# Patient Record
Sex: MALE | Race: AMERICAN INDIAN OR ALASKA NATIVE
[De-identification: names, ages, dates, MRNs, and addresses within clinical notes are randomized per-mention and may not be internally consistent; named-entity substitution may affect disease eponyms.]

---

## 2020-02-21 ENCOUNTER — HOSPITAL ENCOUNTER (EMERGENCY)
Dept: HOSPITAL 5 - ED | Age: 30
Discharge: HOME | End: 2020-02-21
Payer: COMMERCIAL

## 2020-02-21 VITALS — SYSTOLIC BLOOD PRESSURE: 118 MMHG | DIASTOLIC BLOOD PRESSURE: 72 MMHG

## 2020-02-21 DIAGNOSIS — J45.909: ICD-10-CM

## 2020-02-21 DIAGNOSIS — R07.89: Primary | ICD-10-CM

## 2020-02-21 LAB
BASOPHILS # (AUTO): 0.1 K/MM3 (ref 0–0.1)
BASOPHILS NFR BLD AUTO: 0.6 % (ref 0–1.8)
BUN SERPL-MCNC: 13 MG/DL (ref 9–20)
BUN/CREAT SERPL: 16 %
CALCIUM SERPL-MCNC: 9.2 MG/DL (ref 8.4–10.2)
EOSINOPHIL # BLD AUTO: 0.2 K/MM3 (ref 0–0.4)
EOSINOPHIL NFR BLD AUTO: 1.8 % (ref 0–4.3)
HCT VFR BLD CALC: 41.8 % (ref 35.5–45.6)
HEMOLYSIS INDEX: 23
HGB BLD-MCNC: 14.4 GM/DL (ref 11.8–15.2)
LYMPHOCYTES # BLD AUTO: 2.1 K/MM3 (ref 1.2–5.4)
LYMPHOCYTES NFR BLD AUTO: 23.5 % (ref 13.4–35)
MCHC RBC AUTO-ENTMCNC: 34 % (ref 32–34)
MCV RBC AUTO: 93 FL (ref 84–94)
MONOCYTES # (AUTO): 0.4 K/MM3 (ref 0–0.8)
MONOCYTES % (AUTO): 4.2 % (ref 0–7.3)
PLATELET # BLD: 317 K/MM3 (ref 140–440)
RBC # BLD AUTO: 4.49 M/MM3 (ref 3.65–5.03)

## 2020-02-21 PROCEDURE — 36415 COLL VENOUS BLD VENIPUNCTURE: CPT

## 2020-02-21 PROCEDURE — 93005 ELECTROCARDIOGRAM TRACING: CPT

## 2020-02-21 PROCEDURE — 71045 X-RAY EXAM CHEST 1 VIEW: CPT

## 2020-02-21 PROCEDURE — 80048 BASIC METABOLIC PNL TOTAL CA: CPT

## 2020-02-21 PROCEDURE — 83735 ASSAY OF MAGNESIUM: CPT

## 2020-02-21 PROCEDURE — 82550 ASSAY OF CK (CPK): CPT

## 2020-02-21 PROCEDURE — 85025 COMPLETE CBC W/AUTO DIFF WBC: CPT

## 2020-02-21 PROCEDURE — 93010 ELECTROCARDIOGRAM REPORT: CPT

## 2020-02-21 PROCEDURE — 84484 ASSAY OF TROPONIN QUANT: CPT

## 2020-02-21 PROCEDURE — 82553 CREATINE MB FRACTION: CPT

## 2020-02-21 NOTE — XRAY REPORT
CHEST 1 VIEW 



INDICATION:  Chest pain for one day.



COMPARISON:  None



FINDINGS:

Support devices: None. 



Heart: Within normal limits. 

Lungs/Pleura: No acute air space or interstitial disease. 



Additional findings: None.



IMPRESSION:

 No acute findings.



Signer Name: Papi Izquierdo Jr, MD 

Signed: 2/21/2020 11:58 AM

 Workstation Name: [id number]

## 2020-02-21 NOTE — EMERGENCY DEPARTMENT REPORT
ED Chest Pain HPI





- General


Chief Complaint: Chest Pain


Stated Complaint: CHEST PAIN


Time Seen by Provider: 02/21/20 11:39


Source: patient


Mode of arrival: Ambulatory


Limitations: No Limitations





- History of Present Illness


Initial Comments: 


This is a 29-year-old gentleman that was sent here from [facility].  It appears 

that primarily they were concerned about his EKG which shows early 

repolarization.  The patient describes a very atypical chest pain pattern with 

"electric shocks" in his anterior chest which last for moments.  This occurred 

this morning.  It was nonpleuritic and nonexertional in nature.  He is 

asymptomatic at this time.  He denies any other associated symptoms.  He states 

he has had chest pain before but this is somewhat different.  He denies leg pain

or swelling.  He is completely without dyspnea.





MD Complaint: chest pain


-: Gradual, minutes(s)


Onset: during rest


Pain Location: substernal


Pain Radiation: none


Severity: mild


Quality: other


Consistency: intermittent, now resolved


Improves With: nothing


Worsens With: nothing


re: denies: nausea, vomting, diaphoresis, dyspnea, sense of impending doom


Other Symptoms: denies: cough, fever, syncope


Treatments Prior to Arrival: none





- Related Data


                                    Allergies











Allergy/AdvReac Type Severity Reaction Status Date / Time


 


No Known Allergies Allergy   Verified 02/21/20 10:53














Heart Score





- HEART Score


History: Slightly suspicious


EKG: Normal


Age: < 45


Risk factors: 1-2 risk factors


Troponin: < normal limit


HEART Score: 1





- Critical Actions


Critical Actions: 0-3 pts:0.9-1.7%risk of adverse cardiac event.Candidate for 

discharge





ED Review of Systems


ROS: 


Stated complaint: CHEST PAIN


Other details as noted in HPI





Constitutional: denies: chills, fever


Eyes: denies: eye pain, eye discharge, vision change


ENT: denies: ear pain, throat pain


Respiratory: denies: cough, shortness of breath, wheezing


Cardiovascular: chest pain.  denies: palpitations


Endocrine: no symptoms reported


Gastrointestinal: denies: abdominal pain, nausea, diarrhea


Genitourinary: denies: urgency, dysuria


Musculoskeletal: denies: back pain, joint swelling, arthralgia


Skin: denies: rash, lesions


Neurological: denies: headache, weakness, paresthesias


Psychiatric: denies: anxiety, depression


Hematological/Lymphatic: denies: easy bleeding, easy bruising





ED Past Medical Hx





- Past Medical History


Hx Asthma: Yes (BRONCHITIS)





- Social History


Smoking Status: Never Smoker


Substance Use Type: None





ED Physical Exam





- General


Limitations: No Limitations


General appearance: alert, in no apparent distress





- Head


Head exam: Present: atraumatic, normocephalic





- Eye


Eye exam: Present: normal appearance.  Absent: scleral icterus





- ENT


ENT exam: Present: mucous membranes moist





- Neck


Neck exam: Present: normal inspection





- Respiratory


Respiratory exam: Present: normal lung sounds bilaterally.  Absent: respiratory 

distress





- Cardiovascular


Cardiovascular Exam: Present: regular rate, normal rhythm.  Absent: systolic 

murmur, diastolic murmur, rubs, gallop





- GI/Abdominal


GI/Abdominal exam: Present: soft, normal bowel sounds.  Absent: distended, tende

rness, guarding, rebound





- Rectal


Rectal exam: Present: deferred





- Extremities Exam


Extremities exam: Present: normal inspection, full ROM, normal capillary refill.

 Absent: tenderness, pedal edema, joint swelling, calf tenderness





- Back Exam


Back exam: Present: normal inspection





- Neurological Exam


Neurological exam: Present: alert, oriented X3, CN II-XII intact.  Absent: motor

sensory deficit





- Psychiatric


Psychiatric exam: Present: normal affect, normal mood





- Skin


Skin exam: Present: warm, dry, intact, normal color.  Absent: rash





ED Course


                                   Vital Signs











  02/21/20 02/21/20 02/21/20





  10:51 11:42 11:45


 


Temperature 98.1 F  


 


Pulse Rate 78 73 72


 


Respiratory 18 17 22





Rate   


 


Blood Pressure 136/70  131/65


 


O2 Sat by Pulse 100 96 100





Oximetry   














  02/21/20 02/21/20 02/21/20





  12:00 12:15 12:30


 


Temperature   


 


Pulse Rate 75 74 71


 


Respiratory 13 14 13





Rate   


 


Blood Pressure 128/76 138/81 123/57


 


O2 Sat by Pulse 97 100 94





Oximetry   














  02/21/20 02/21/20 02/21/20





  12:45 13:00 13:15


 


Temperature   


 


Pulse Rate 76 73 73


 


Respiratory 16 12 11 L





Rate   


 


Blood Pressure 126/67 114/65 124/52


 


O2 Sat by Pulse 98 98 100





Oximetry   














  02/21/20 02/21/20 02/21/20





  13:30 13:46 14:00


 


Temperature   


 


Pulse Rate 73 82 78


 


Respiratory 18 19 17





Rate   


 


Blood Pressure 131/110 106/65 118/81


 


O2 Sat by Pulse 99 98 99





Oximetry   














- Reevaluation(s)


Reevaluation #1: 


Patient remains asymptomatic here.


02/21/20 14:09








TABATHA score





- Tabatha Score


Age > 65: (0) No


Aspirin use within the Past 7 Days: (0) No


3 or more CAD Risk Factors: (0) No


2 or more Angina events in past 24 hrs: (0) No


Known CAD with more than 50% Stenosis: (0) No


Elevated Cardiac Markers: (0) No


ST Deviation Greater than 0.5mm: (0) No


TABATHA Score: 0





ED Medical Decision Making





- Lab Data


Result diagrams: 


                                 02/21/20 12:13





                                 02/21/20 12:13








                         Laboratory Results - last 24 hr











  02/21/20 02/21/20





  12:13 12:13


 


WBC  9.0 


 


RBC  4.49 


 


Hgb  14.4 


 


Hct  41.8 


 


MCV  93 


 


MCH  32 


 


MCHC  34 


 


RDW  13.6 


 


Plt Count  317 


 


Lymph % (Auto)  23.5 


 


Mono % (Auto)  4.2 


 


Eos % (Auto)  1.8 


 


Baso % (Auto)  0.6 


 


Lymph #  2.1 


 


Mono #  0.4 


 


Eos #  0.2 


 


Baso #  0.1 


 


Seg Neutrophils %  69.9 


 


Seg Neutrophils #  6.3 


 


Sodium   144


 


Potassium   3.8


 


Chloride   107.0


 


Carbon Dioxide   21 L


 


Anion Gap   20


 


BUN   13


 


Creatinine   0.8


 


Estimated GFR   > 60


 


BUN/Creatinine Ratio   16


 


Glucose   137 H


 


Calcium   9.2


 


Magnesium   1.90


 


CK-MB (CK-2)   2.8


 


Troponin T   < 0.010














- EKG Data


-: EKG Interpreted by Me


EKG shows normal: sinus rhythm, axis, intervals, QRS complexes, ST-T waves


Rate: normal





- EKG Data


Interpretation: other (A normal early repolarization pattern)





- Radiology Data


Radiology results: report reviewed (Normal chest x-ray)


Critical care attestation.: 


If time is entered above; I have spent that time in minutes in the direct care 

of this critically ill patient, excluding procedure time.








ED Disposition


Clinical Impression: 


 Atypical chest pain





Disposition: DC-01 TO HOME OR SELFCARE


Is pt being admited?: No


Does the pt Need Aspirin: No


Condition: Stable


Instructions:  Chest Pain (ED)


Additional Instructions: 


Return any prolonged chest pain for further evaluation.  Otherwise I would 

recommend follow-up with your primary care physician as well as a cardiologist. 

See referral.


Referrals: 


JACE JAVIER MD [Primary Care Provider] - 2-3 Days


RADHA GARCIA MD [Staff Physician] - 3-5 Days


Time of Disposition: 14:10

## 2020-03-03 ENCOUNTER — HOSPITAL ENCOUNTER (EMERGENCY)
Dept: HOSPITAL 5 - ED | Age: 30
Discharge: HOME | End: 2020-03-03
Payer: COMMERCIAL

## 2020-03-03 VITALS — DIASTOLIC BLOOD PRESSURE: 75 MMHG | SYSTOLIC BLOOD PRESSURE: 132 MMHG

## 2020-03-03 DIAGNOSIS — Z79.1: ICD-10-CM

## 2020-03-03 DIAGNOSIS — Z79.899: ICD-10-CM

## 2020-03-03 DIAGNOSIS — J45.909: ICD-10-CM

## 2020-03-03 DIAGNOSIS — J03.90: Primary | ICD-10-CM

## 2020-03-03 PROCEDURE — 99282 EMERGENCY DEPT VISIT SF MDM: CPT

## 2020-03-03 NOTE — EMERGENCY DEPARTMENT REPORT
ED ENT HPI





- General


Stated complaint: SWELIING TONSILS/PAIN


Time Seen by Provider: 03/03/20 17:02





- History of Present Illness


Initial comments: 





This is a 30-year-old male nontoxic well in appearance with no signs of distress

presents to the ED with complaint of sore throat.  Patient denies any drooling 

or hoarseness.  Patient denies any other symptoms.  Denies any fever, chills, 

headache, nausea, vomiting, chest pain or SOB.  Denies any other complaints. 





-: days(s)


Location: throat


Severity: mild


Severity scale (0 -10): 8


Quality: aching


Consistency: constant


Improves with: none


Worsens with: swallowing


Associated Symptoms: pain with swallowing, sore throat.  denies: fever, cough, 

gum swelling, toothache, tinnitus, hearing loss, discharge from ear, rhinorrhea





- Related Data


                                  Previous Rx's











 Medication  Instructions  Recorded  Last Taken  Type


 


Amoxicillin/K Clav Tab [Augmentin 1 tab PO Q12HR #20 tab 03/03/20 Unknown Rx





875 mg]    


 


Ibuprofen [Motrin] 600 mg PO Q8H PRN #20 tablet 03/03/20 Unknown Rx


 


Nystas/Diphen/Xyl Visc/Mylanta 15 ml MM Q6H PRN 5 Days  ml 03/03/20 Unknown Rx





[Magic Mouthwash]    











                                    Allergies











Allergy/AdvReac Type Severity Reaction Status Date / Time


 


No Known Allergies Allergy   Verified 02/21/20 10:53














ED Dental HPI





- General


Stated complaint: SWELIING TONSILS/PAIN


Time Seen by Provider: 03/03/20 17:02





- Related Data


                                  Previous Rx's











 Medication  Instructions  Recorded  Last Taken  Type


 


Amoxicillin/K Clav Tab [Augmentin 1 tab PO Q12HR #20 tab 03/03/20 Unknown Rx





875 mg]    


 


Ibuprofen [Motrin] 600 mg PO Q8H PRN #20 tablet 03/03/20 Unknown Rx


 


Nystas/Diphen/Xyl Visc/Mylanta 15 ml MM Q6H PRN 5 Days  ml 03/03/20 Unknown Rx





[Magic Mouthwash]    











                                    Allergies











Allergy/AdvReac Type Severity Reaction Status Date / Time


 


No Known Allergies Allergy   Verified 02/21/20 10:53














ED Review of Systems


ROS: 


Stated complaint: SWELIING TONSILS/PAIN


Other details as noted in HPI





Constitutional: denies: chills, fever


Eyes: denies: eye pain, eye discharge, vision change


ENT: throat pain.  denies: ear pain


Respiratory: denies: cough, shortness of breath, wheezing


Cardiovascular: denies: chest pain, palpitations


Endocrine: no symptoms reported


Gastrointestinal: denies: abdominal pain, nausea, diarrhea


Genitourinary: denies: urgency, dysuria


Musculoskeletal: denies: back pain, joint swelling, arthralgia


Skin: denies: rash, lesions


Neurological: denies: headache, weakness, paresthesias


Psychiatric: denies: anxiety, depression


Hematological/Lymphatic: denies: easy bleeding, easy bruising





ED Past Medical Hx





- Past Medical History


Hx Asthma: Yes (BRONCHITIS)





- Social History


Smoking Status: Never Smoker


Substance Use Type: None





- Medications


Home Medications: 


                                Home Medications











 Medication  Instructions  Recorded  Confirmed  Last Taken  Type


 


Amoxicillin/K Clav Tab [Augmentin 1 tab PO Q12HR #20 tab 03/03/20  Unknown Rx





875 mg]     


 


Ibuprofen [Motrin] 600 mg PO Q8H PRN #20 tablet 03/03/20  Unknown Rx


 


Nystas/Diphen/Xyl Visc/Mylanta 15 ml MM Q6H PRN 5 Days  ml 03/03/20  Unknown Rx





[Magic Mouthwash]     














ED Physical Exam





- General


General appearance: alert, in no apparent distress





- Head


Head exam: Present: atraumatic, normocephalic





- Eye


Eye exam: Present: normal appearance





- Expanded ENT Exam


  ** Expanded


Ear exam: Present: normal external inspection


Mouth exam: Present: normal external inspection, tongue normal.  Absent: 

drooling, trismus, muffled voice


Teeth exam: Present: normal inspection


Throat exam: Positive: tonsillar erythema, tonsillomegaly (2+), tonsillar 

exudate, other (uvula midline).  Negative: R peritonsillar mass, L peritonsillar

mass





- Neck


Neck exam: Present: normal inspection, full ROM.  Absent: tenderness, 

meningismus, lymphadenopathy





- Extremities Exam


Extremities exam: Present: normal inspection, full ROM





- Back Exam


Back exam: Present: normal inspection, full ROM





- Neurological Exam


Neurological exam: Present: alert, oriented X3, normal gait





- Psychiatric


Psychiatric exam: Present: normal affect, normal mood





- Skin


Skin exam: Present: warm, dry, intact, normal color.  Absent: rash





ED Course





- Reevaluation(s)


Reevaluation #1: 





03/03/20 17:05


Patient is speaking in full sentences with no signs of distress noted.





ED Medical Decision Making





- Medical Decision Making





Patient was instructed to Follow-up with a primary care doctor in 3-5 days or if

symptoms worsen and continue return to emergency room as soon as possible.  At 

time of discharge, the patient does not seem toxic or ill in appearance.  No 

acute signs of distress noted.  Patient agrees to discharge treatment plan of 

care.  No further questions noted by the patient.





Critical care attestation.: 


If time is entered above; I have spent that time in minutes in the direct care 

of this critically ill patient, excluding procedure time.








ED Disposition


Clinical Impression: 


 Tonsillitis with exudate





Disposition: DC-01 TO HOME OR SELFCARE


Is pt being admited?: No


Does the pt Need Aspirin: No


Condition: Stable


Instructions:  Tonsillitis (ED)


Additional Instructions: 


Follow-up with a primary care doctor in 3-5 days or if symptoms worsen and 

continue return to emergency room as soon as possible. 


Prescriptions: 


Amoxicillin/K Clav Tab [Augmentin 875 mg] 1 tab PO Q12HR #20 tab


Nystas/Diphen/Xyl Visc/Mylanta [Magic Mouthwash] 15 ml MM Q6H PRN 5 Days  ml


 PRN Reason: Sore Throat


Ibuprofen [Motrin] 600 mg PO Q8H PRN #20 tablet


 PRN Reason: Pain


Referrals: 


PRIMARY CARE,MD [Referring] - 3-5 Days


VIKASH MARK MD [Staff Physician] - 3-5 Days


[facility] [Outside] - 3-5 Days


Forms:  Work/School Release Form(ED)

## 2020-08-18 ENCOUNTER — HOSPITAL ENCOUNTER (EMERGENCY)
Dept: HOSPITAL 5 - ED | Age: 30
Discharge: HOME | End: 2020-08-18
Payer: COMMERCIAL

## 2020-08-18 VITALS — DIASTOLIC BLOOD PRESSURE: 68 MMHG | SYSTOLIC BLOOD PRESSURE: 127 MMHG
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
## 2020-08-18 DIAGNOSIS — S99.912A: Primary | ICD-10-CM
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
## 2020-08-18 PROCEDURE — 99283 EMERGENCY DEPT VISIT LOW MDM: CPT

## 2020-08-18 NOTE — EMERGENCY DEPARTMENT REPORT
ED Lower Extremity HPI





- General


Chief Complaint: Extremity Injury, Lower


Stated Complaint: LEFT LEG PAIN


Time Seen by Provider: 08/18/20 23:08


Source: patient


Mode of arrival: Ambulatory


Limitations: No Limitations





- History of Present Illness


Initial Comments: 





30-year-old -American male was riding on a forklift with his foot hanging

off the side and when going to a location he got caught on the wall causing it 

to have any externally rotation type injury.


MD Complaint: ankle injury


-: Gradual


Injury: Ankle: Left


Type of Injury: eversion


Place: home


Severity: mild


Improves With: nothing


Worsens With: nothing


Associated Symptoms: swelling





- Related Data


                                  Previous Rx's











 Medication  Instructions  Recorded  Last Taken  Type


 


Amoxicillin/K Clav Tab [Augmentin 1 tab PO Q12HR #20 tab 03/03/20 Unknown Rx





875 mg]    


 


Ibuprofen [Motrin] 600 mg PO Q8H PRN #20 tablet 03/03/20 Unknown Rx


 


Nystas/Diphen/Xyl Visc/Mylanta 15 ml MM Q6H PRN 5 Days  ml 03/03/20 Unknown Rx





[Magic Mouthwash]    


 


Ketorolac [Toradol] 10 mg PO Q6H PRN #14 tablet 08/18/20 Unknown Rx











                                    Allergies











Allergy/AdvReac Type Severity Reaction Status Date / Time


 


No Known Allergies Allergy   Verified 02/21/20 10:53














ED Review of Systems


ROS: 


Stated complaint: LEFT LEG PAIN


Other details as noted in HPI





Comment: All other systems reviewed and negative





ED Past Medical Hx





- Past Medical History


Hx Asthma: Yes (BRONCHITIS)





- Surgical History


Past Surgical History?: No





- Social History


Smoking Status: Never Smoker


Substance Use Type: None





- Medications


Home Medications: 


                                Home Medications











 Medication  Instructions  Recorded  Confirmed  Last Taken  Type


 


Amoxicillin/K Clav Tab [Augmentin 1 tab PO Q12HR #20 tab 03/03/20  Unknown Rx





875 mg]     


 


Ibuprofen [Motrin] 600 mg PO Q8H PRN #20 tablet 03/03/20  Unknown Rx


 


Nystas/Diphen/Xyl Visc/Mylanta 15 ml MM Q6H PRN 5 Days  ml 03/03/20  Unknown Rx





[Magic Mouthwash]     


 


Ketorolac [Toradol] 10 mg PO Q6H PRN #14 tablet 08/18/20  Unknown Rx














ED Physical Exam





- General


Limitations: No Limitations


General appearance: alert, in no apparent distress





- Head


Head exam: Present: atraumatic, normocephalic





- Eye


Eye exam: Present: normal appearance, PERRL, EOMI


Pupils: Present: normal accommodation





- ENT


ENT exam: Present: mucous membranes moist





- Neck


Neck exam: Present: normal inspection





- Respiratory


Respiratory exam: Present: normal lung sounds bilaterally.  Absent: respiratory 

distress, wheezes, rales, rhonchi, accessory muscle use, decreased breath sounds





- Cardiovascular


Cardiovascular Exam: Present: regular rate, normal rhythm.  Absent: systolic 

murmur, diastolic murmur, rubs, gallop





- GI/Abdominal


GI/Abdominal exam: Present: soft, normal bowel sounds





- Rectal


Rectal exam: Present: deferred





- Extremities Exam


Extremities exam: Present: normal inspection





- Back Exam


Back exam: Present: normal inspection





- Neurological Exam


Neurological exam: Present: alert, oriented X3





- Psychiatric


Psychiatric exam: Present: normal affect, normal mood





- Skin


Skin exam: Present: warm, dry, intact, normal color.  Absent: rash





ED Course





                                   Vital Signs











  08/18/20





  18:21


 


Temperature 98.5 F


 


Pulse Rate 69


 


Respiratory 18





Rate 


 


Blood Pressure 127/68


 


O2 Sat by Pulse 99





Oximetry 











Critical care attestation.: 


If time is entered above; I have spent that time in minutes in the direct care 

of this critically ill patient, excluding procedure time.








ED Disposition


Disposition: DC-01 TO HOME OR SELFCARE


Is pt being admited?: No


Does the pt Need Aspirin: No


Condition: Stable


Instructions:  Ankle Sprain (ED), Ankle Stirrup Splint (ED), Ankle Exercises 

(GEN), Ice Pack Application (ED)


Prescriptions: 


Ketorolac [Toradol] 10 mg PO Q6H PRN #14 tablet


 PRN Reason: Pain


Referrals: 


JACKSON WALDRON MD [Staff Physician] - 3-5 Days


Forms:  Work/School Release Form(ED)

## 2020-08-18 NOTE — XRAY REPORT
LEFT ANKLE AND FOOT



INDICATION / CLINICAL INFORMATION:

Pain and swelling after injury



COMPARISON:

None available.

 

FINDINGS:



BONES / JOINT(S): No acute fracture or subluxation. No significant arthritis.



SOFT TISSUES: There is diffuse soft tissue edema about the mid foot and ankle.



ADDITIONAL FINDINGS: None.



Signer Name: Waldemar Silva MD 

Signed: 8/18/2020 10:26 PM

Workstation Name: [id number]

## 2020-08-18 NOTE — XRAY REPORT
LEFT ANKLE AND FOOT



INDICATION / CLINICAL INFORMATION:

Pain and swelling after injury



COMPARISON:

None available.

 

FINDINGS:



BONES / JOINT(S): No acute fracture or subluxation. No significant arthritis.



SOFT TISSUES: There is diffuse soft tissue edema about the mid foot and ankle.



ADDITIONAL FINDINGS: None.



Signer Name: Waldemar Silva MD 

Signed: 8/18/2020 10:26 PM

Workstation Name: [id number]